# Patient Record
Sex: MALE | Race: WHITE | NOT HISPANIC OR LATINO
[De-identification: names, ages, dates, MRNs, and addresses within clinical notes are randomized per-mention and may not be internally consistent; named-entity substitution may affect disease eponyms.]

---

## 2021-10-29 ENCOUNTER — APPOINTMENT (OUTPATIENT)
Dept: OTOLARYNGOLOGY | Facility: CLINIC | Age: 86
End: 2021-10-29
Payer: MEDICARE

## 2021-10-29 VITALS — WEIGHT: 171 LBS | HEIGHT: 65 IN | BODY MASS INDEX: 28.49 KG/M2

## 2021-10-29 DIAGNOSIS — Z78.9 OTHER SPECIFIED HEALTH STATUS: ICD-10-CM

## 2021-10-29 DIAGNOSIS — Z83.3 FAMILY HISTORY OF DIABETES MELLITUS: ICD-10-CM

## 2021-10-29 DIAGNOSIS — C44.212 BASAL CELL CARCINOMA OF SKIN OF RIGHT EAR AND EXTERNAL AURICULAR CANAL: ICD-10-CM

## 2021-10-29 DIAGNOSIS — Z86.79 PERSONAL HISTORY OF OTHER DISEASES OF THE CIRCULATORY SYSTEM: ICD-10-CM

## 2021-10-29 DIAGNOSIS — Z87.891 PERSONAL HISTORY OF NICOTINE DEPENDENCE: ICD-10-CM

## 2021-10-29 PROBLEM — Z00.00 ENCOUNTER FOR PREVENTIVE HEALTH EXAMINATION: Status: ACTIVE | Noted: 2021-10-29

## 2021-10-29 PROCEDURE — 99203 OFFICE O/P NEW LOW 30 MIN: CPT

## 2021-10-29 RX ORDER — SIMVASTATIN 80 MG/1
TABLET, FILM COATED ORAL
Refills: 0 | Status: ACTIVE | COMMUNITY

## 2021-10-29 RX ORDER — ALLOPURINOL 100 MG/1
100 TABLET ORAL
Refills: 0 | Status: ACTIVE | COMMUNITY

## 2021-10-29 RX ORDER — RAMIPRIL 5 MG/1
CAPSULE ORAL
Refills: 0 | Status: ACTIVE | COMMUNITY

## 2021-10-29 RX ORDER — COLCHICINE 0.6 MG/1
TABLET ORAL
Refills: 0 | Status: ACTIVE | COMMUNITY

## 2021-10-29 RX ORDER — DIGOXIN 0.06 MG/1
TABLET ORAL
Refills: 0 | Status: ACTIVE | COMMUNITY

## 2021-10-29 RX ORDER — WARFARIN 4 MG/1
TABLET ORAL
Refills: 0 | Status: ACTIVE | COMMUNITY

## 2021-10-29 RX ORDER — METOPROLOL TARTRATE 75 MG/1
TABLET, FILM COATED ORAL
Refills: 0 | Status: ACTIVE | COMMUNITY

## 2021-10-29 RX ORDER — METFORMIN HYDROCHLORIDE 625 MG/1
TABLET ORAL
Refills: 0 | Status: ACTIVE | COMMUNITY

## 2021-10-29 NOTE — REASON FOR VISIT
[Initial Consultation] : an initial consultation for [Family Member] : family member [FreeTextEntry2] : referred by Dr. Yao Ornelas, ENT for growth behind right ear.

## 2021-10-29 NOTE — HISTORY OF PRESENT ILLNESS
[de-identified] : 85 year old male referred by Dr. Yao Ornelas, ENT for growth behind right ear. Son reports growth inside right ear first noticed in 2019 and was removed. States currently has new growth behind right ear and was told could be due to the growth removal from 2019. Reports has been increasing in size and intermittent bleeding. Denies pain. Former smoker, quit over 25 years ago. Denies fevers, chills, night sweats, weight loss. Biopsy 10/14/2021 showed- tiny cluster tissue composed of atypical cells in a desmoplastic stroma, suggestive of basal cell carcinoma. 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  Patient notes he is on Coumadin for atrial fibrillation and has had some bleeding from a crust on top of the lesion when he has disturbed it.

## 2021-10-29 NOTE — PHYSICAL EXAM
[FreeTextEntry1] : Using a cane otherwise doing quite well [de-identified] : No cervical adenopathy noted some chronic skin changes from sun exposure [de-identified] : Normal salivary gland exam including the right parotid with no palpable lymphadenopathy [de-identified] : Normal TMJ mobility [de-identified] : Left side within normal limits right side obvious basal cell carcinoma involving the inferior portion of the conchal bowl and superior portion of the lobule, 1.5 cm in diameter [de-identified] : Tympanic membrane is within normal limits no evidence of external auditory canal involvement [Normal] : mucosa is normal

## 2021-11-02 PROBLEM — C44.212 BASAL CELL CARCINOMA, EAR, RIGHT: Status: ACTIVE | Noted: 2021-11-02

## 2021-11-23 ENCOUNTER — OUTPATIENT (OUTPATIENT)
Dept: OUTPATIENT SERVICES | Facility: HOSPITAL | Age: 86
LOS: 1 days | End: 2021-11-23
Payer: MEDICARE

## 2021-11-23 VITALS
WEIGHT: 175.05 LBS | TEMPERATURE: 97 F | OXYGEN SATURATION: 99 % | HEIGHT: 62 IN | SYSTOLIC BLOOD PRESSURE: 110 MMHG | DIASTOLIC BLOOD PRESSURE: 70 MMHG | RESPIRATION RATE: 16 BRPM | HEART RATE: 88 BPM

## 2021-11-23 DIAGNOSIS — C44.90 UNSPECIFIED MALIGNANT NEOPLASM OF SKIN, UNSPECIFIED: Chronic | ICD-10-CM

## 2021-11-23 DIAGNOSIS — C44.90 UNSPECIFIED MALIGNANT NEOPLASM OF SKIN, UNSPECIFIED: ICD-10-CM

## 2021-11-23 DIAGNOSIS — C44.212 BASAL CELL CARCINOMA OF SKIN OF RIGHT EAR AND EXTERNAL AURICULAR CANAL: ICD-10-CM

## 2021-11-23 DIAGNOSIS — R54 AGE-RELATED PHYSICAL DEBILITY: ICD-10-CM

## 2021-11-23 DIAGNOSIS — M12.9 ARTHROPATHY, UNSPECIFIED: Chronic | ICD-10-CM

## 2021-11-23 LAB
A1C WITH ESTIMATED AVERAGE GLUCOSE RESULT: 6.8 % — HIGH (ref 4–5.6)
ALBUMIN SERPL ELPH-MCNC: 4.2 G/DL — SIGNIFICANT CHANGE UP (ref 3.3–5)
ALP SERPL-CCNC: 66 U/L — SIGNIFICANT CHANGE UP (ref 40–120)
ALT FLD-CCNC: 17 U/L — SIGNIFICANT CHANGE UP (ref 4–41)
ANION GAP SERPL CALC-SCNC: 11 MMOL/L — SIGNIFICANT CHANGE UP (ref 7–14)
AST SERPL-CCNC: 16 U/L — SIGNIFICANT CHANGE UP (ref 4–40)
BILIRUB SERPL-MCNC: 0.6 MG/DL — SIGNIFICANT CHANGE UP (ref 0.2–1.2)
BUN SERPL-MCNC: 14 MG/DL — SIGNIFICANT CHANGE UP (ref 7–23)
CALCIUM SERPL-MCNC: 9.3 MG/DL — SIGNIFICANT CHANGE UP (ref 8.4–10.5)
CHLORIDE SERPL-SCNC: 109 MMOL/L — HIGH (ref 98–107)
CO2 SERPL-SCNC: 22 MMOL/L — SIGNIFICANT CHANGE UP (ref 22–31)
CREAT SERPL-MCNC: 0.77 MG/DL — SIGNIFICANT CHANGE UP (ref 0.5–1.3)
ESTIMATED AVERAGE GLUCOSE: 148 — SIGNIFICANT CHANGE UP
GLUCOSE SERPL-MCNC: 133 MG/DL — HIGH (ref 70–99)
HCT VFR BLD CALC: 38 % — LOW (ref 39–50)
HGB BLD-MCNC: 12.1 G/DL — LOW (ref 13–17)
MCHC RBC-ENTMCNC: 29.6 PG — SIGNIFICANT CHANGE UP (ref 27–34)
MCHC RBC-ENTMCNC: 31.8 GM/DL — LOW (ref 32–36)
MCV RBC AUTO: 92.9 FL — SIGNIFICANT CHANGE UP (ref 80–100)
NRBC # BLD: 0 /100 WBCS — SIGNIFICANT CHANGE UP
NRBC # FLD: 0 K/UL — SIGNIFICANT CHANGE UP
PLATELET # BLD AUTO: 203 K/UL — SIGNIFICANT CHANGE UP (ref 150–400)
POTASSIUM SERPL-MCNC: 4.4 MMOL/L — SIGNIFICANT CHANGE UP (ref 3.5–5.3)
POTASSIUM SERPL-SCNC: 4.4 MMOL/L — SIGNIFICANT CHANGE UP (ref 3.5–5.3)
PROT SERPL-MCNC: 6.7 G/DL — SIGNIFICANT CHANGE UP (ref 6–8.3)
RBC # BLD: 4.09 M/UL — LOW (ref 4.2–5.8)
RBC # FLD: 14 % — SIGNIFICANT CHANGE UP (ref 10.3–14.5)
SODIUM SERPL-SCNC: 142 MMOL/L — SIGNIFICANT CHANGE UP (ref 135–145)
WBC # BLD: 3.75 K/UL — LOW (ref 3.8–10.5)
WBC # FLD AUTO: 3.75 K/UL — LOW (ref 3.8–10.5)

## 2021-11-23 PROCEDURE — 93010 ELECTROCARDIOGRAM REPORT: CPT

## 2021-11-23 RX ORDER — SIMVASTATIN 20 MG/1
1 TABLET, FILM COATED ORAL
Qty: 0 | Refills: 0 | DISCHARGE

## 2021-11-23 RX ORDER — RAMIPRIL 5 MG
1 CAPSULE ORAL
Qty: 0 | Refills: 0 | DISCHARGE

## 2021-11-23 RX ORDER — SODIUM CHLORIDE 9 MG/ML
1000 INJECTION, SOLUTION INTRAVENOUS
Refills: 0 | Status: DISCONTINUED | OUTPATIENT
Start: 2021-12-01 | End: 2021-12-15

## 2021-11-23 RX ORDER — METOPROLOL TARTRATE 50 MG
1 TABLET ORAL
Qty: 0 | Refills: 0 | DISCHARGE

## 2021-11-23 NOTE — H&P PST ADULT - NSICDXPASTMEDICALHX_GEN_ALL_CORE_FT
PAST MEDICAL HISTORY:  Altered gait ambulates with cane    Atrial fibrillation     Diabetes mellitus type II diagnosed 5-10 years ago    Gout     Hypercholesterolemia     Hypertension     Language barrier native language Tuvaluan; comprends and verbalizes some English    Pain fractured pelvis     PAST MEDICAL HISTORY:  Altered gait ambulates with cane    Atrial fibrillation     Diabetes mellitus type II diagnosed 5-10 years ago    Gout     Hard of hearing     Hypercholesterolemia     Hypertension     Language barrier native language [place]an; comprends and verbalizes some English    Pain fractured pelvis

## 2021-11-23 NOTE — H&P PST ADULT - PROBLEM SELECTOR PLAN 2
Await medical evaluation with pcp due to advanced age and patient poor historian  * Instructed to stop metformin 24 hours before surgery  * Instructed to speak with pcp regarding when or if to stop eliquis  * Instructed to take normal am dose of    the am of surgery Await medical evaluation with pcp due to advanced age and patient poor historian  * Instructed to stop metformin 24 hours before surgery  * Instructed to speak with pcp regarding when or if to stop eliquis  * Instructed to take normal am dose of allopurinol the am of surgery Await medical evaluation with pcp due to advanced age and patient poor historian  * Instructed to stop metformin 24 hours before surgery  * Instructed to speak with pcp regarding when or if to stop eliquis  * Instructed to take normal am dose of allopurinol the am of surgery  * Instructed to stop colchicine seven days before surgery

## 2021-11-23 NOTE — H&P PST ADULT - NSANTHOSAYNRD_GEN_A_CORE
No. KLAUDIA screening performed.  STOP BANG Legend: 0-2 = LOW Risk; 3-4 = INTERMEDIATE Risk; 5-8 = HIGH Risk

## 2021-11-23 NOTE — H&P PST ADULT - NSICDXPASTSURGICALHX_GEN_ALL_CORE_FT
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

## 2021-11-23 NOTE — H&P PST ADULT - ATTENDING COMMENTS
Consent via Hebrew .  Patient presents for wide local excision of RIGHT ear BCC, local reconstruction as indicated with Dr. Dixon.  Son is also here who is power of  for the patient.

## 2021-11-23 NOTE — H&P PST ADULT - PROBLEM SELECTOR PLAN 1
This is an 86 y/o male who is cheduled for wide local excision right ear, ear cartilage graft to nose or ear, full thickness graft 20 square or less, adjacent tissue transfer 10 square or less on Dec. 1, 2021 This is an 86 y/o male who is cheduled for wide local excision right ear, ear cartilage graft to nose or ear, full thickness graft 20 square or less, adjacent tissue transfer 10 square or less on Dec. 1, 2021  * Instructed to speak with surgeon regarding covid testing  * Given preop instructions

## 2021-11-23 NOTE — H&P PST ADULT - HISTORY OF PRESENT ILLNESS
This is an 84 y/o male with h/o excision of mass (basal cell carcinoma) behind the right ear in 2019. Subsequently it has recurred confirmed with biopsy. Scheduled for wide local excision right ear, ear cartilage graft to nose or ear, full thickness graft 20 square or less, adjacent tissue transfer 10 square or less This is an 84 y/o male who is a poor historian. Patient's native language is [place]. Refused hospital  and utilized son, Jai, for accurate information. Patient presents with h/o excision of mass (basal cell carcinoma) behind the right ear in 2019. Subsequently it has recurred confirmed with biopsy. Scheduled for wide local excision right ear, ear cartilage graft to nose or ear, full thickness graft 20 square or less, adjacent tissue transfer 10 square or less

## 2021-11-30 ENCOUNTER — TRANSCRIPTION ENCOUNTER (OUTPATIENT)
Age: 86
End: 2021-11-30

## 2021-11-30 RX ORDER — APIXABAN 2.5 MG/1
1 TABLET, FILM COATED ORAL
Qty: 0 | Refills: 0 | DISCHARGE

## 2021-11-30 RX ORDER — ALLOPURINOL 300 MG
1 TABLET ORAL
Qty: 0 | Refills: 0 | DISCHARGE

## 2021-11-30 RX ORDER — METFORMIN HYDROCHLORIDE 850 MG/1
1 TABLET ORAL
Qty: 0 | Refills: 0 | DISCHARGE

## 2021-11-30 RX ORDER — COLCHICINE 0.6 MG
1 TABLET ORAL
Qty: 0 | Refills: 0 | DISCHARGE

## 2021-11-30 RX ORDER — DIGOXIN 250 MCG
1 TABLET ORAL
Qty: 0 | Refills: 0 | DISCHARGE

## 2021-11-30 NOTE — ASU PATIENT PROFILE, ADULT - FALL HARM RISK - HARM RISK INTERVENTIONS

## 2021-11-30 NOTE — ASU PATIENT PROFILE, ADULT - HEALTHCARE INFORMATION NEEDED, PROFILE
Patient Instructions by Christina Mendez CMA at 06/27/18 10:40 AM     Author:  Christina Mendez CMA Service:  (none) Author Type:  Certified Medical Assistant     Filed:  06/27/18 10:41 AM Encounter Date:  6/27/2018 Status:  Signed     :  Christina Mendez CMA (Certified Medical Assistant)            Next visit with ILA ALVARADO is on 12/24/2018 at  9:45 AM in INTERNAL MEDICINE SEQ       If you have any questions, please call our office at [phone]    Additional Educational Resources:  For additional resources regarding your symptoms, diagnosis, or further health information, please visit the Health Resources section on [email or web address] or the Online Health Resources section in [employer].          Revision History        User Key Date/Time User Provider Type Action    > [N/A] 06/27/18 10:41 AM Christina Mendez CMA Certified Medical Assistant Sign             none

## 2021-11-30 NOTE — ASU PATIENT PROFILE, ADULT - NSICDXPASTMEDICALHX_GEN_ALL_CORE_FT
PAST MEDICAL HISTORY:  Altered gait ambulates with cane    Atrial fibrillation     Diabetes mellitus type II diagnosed 5-10 years ago    Gout     Hard of hearing     Hypercholesterolemia     Hypertension     Language barrier native language Azerbaijani; comprends and verbalizes some English    Pain fractured pelvis

## 2021-12-01 ENCOUNTER — APPOINTMENT (OUTPATIENT)
Dept: OTOLARYNGOLOGY | Facility: HOSPITAL | Age: 86
End: 2021-12-01

## 2021-12-01 ENCOUNTER — OUTPATIENT (OUTPATIENT)
Dept: OUTPATIENT SERVICES | Facility: HOSPITAL | Age: 86
LOS: 1 days | Discharge: HOME | End: 2021-12-01
Payer: MEDICARE

## 2021-12-01 ENCOUNTER — RESULT REVIEW (OUTPATIENT)
Age: 86
End: 2021-12-01

## 2021-12-01 VITALS
TEMPERATURE: 98 F | DIASTOLIC BLOOD PRESSURE: 77 MMHG | HEIGHT: 62 IN | SYSTOLIC BLOOD PRESSURE: 121 MMHG | HEART RATE: 86 BPM | OXYGEN SATURATION: 95 % | RESPIRATION RATE: 15 BRPM | WEIGHT: 175.05 LBS

## 2021-12-01 VITALS
OXYGEN SATURATION: 95 % | RESPIRATION RATE: 17 BRPM | DIASTOLIC BLOOD PRESSURE: 83 MMHG | HEART RATE: 88 BPM | SYSTOLIC BLOOD PRESSURE: 130 MMHG | TEMPERATURE: 98 F

## 2021-12-01 DIAGNOSIS — M12.9 ARTHROPATHY, UNSPECIFIED: Chronic | ICD-10-CM

## 2021-12-01 DIAGNOSIS — C44.212 BASAL CELL CARCINOMA OF SKIN OF RIGHT EAR AND EXTERNAL AURICULAR CANAL: ICD-10-CM

## 2021-12-01 DIAGNOSIS — C44.90 UNSPECIFIED MALIGNANT NEOPLASM OF SKIN, UNSPECIFIED: Chronic | ICD-10-CM

## 2021-12-01 LAB
GLUCOSE BLDC GLUCOMTR-MCNC: 137 MG/DL — HIGH (ref 70–99)
GLUCOSE BLDC GLUCOMTR-MCNC: 221 MG/DL — HIGH (ref 70–99)

## 2021-12-01 PROCEDURE — 14301 TIS TRNFR ANY 30.1-60 SQ CM: CPT

## 2021-12-01 PROCEDURE — 88305 TISSUE EXAM BY PATHOLOGIST: CPT | Mod: 26

## 2021-12-01 PROCEDURE — 14302 TIS TRNFR ADDL 30 SQ CM: CPT

## 2021-12-01 PROCEDURE — 14060 TIS TRNFR E/N/E/L 10 SQ CM/<: CPT | Mod: 59

## 2021-12-01 PROCEDURE — 21235 EAR CARTILAGE GRAFT: CPT

## 2021-12-01 PROCEDURE — 11644 EXC F/E/E/N/L MAL+MRG 3.1-4: CPT | Mod: 59

## 2021-12-01 PROCEDURE — 88331 PATH CONSLTJ SURG 1 BLK 1SPC: CPT | Mod: 26

## 2021-12-01 RX ORDER — FENTANYL CITRATE 50 UG/ML
25 INJECTION INTRAVENOUS
Refills: 0 | Status: DISCONTINUED | OUTPATIENT
Start: 2021-12-01 | End: 2021-12-01

## 2021-12-01 RX ORDER — OXYCODONE HYDROCHLORIDE 5 MG/1
1 TABLET ORAL
Qty: 10 | Refills: 0
Start: 2021-12-01 | End: 2021-12-14

## 2021-12-01 RX ORDER — ACETAMINOPHEN 500 MG
650 TABLET ORAL EVERY 6 HOURS
Refills: 0 | Status: DISCONTINUED | OUTPATIENT
Start: 2021-12-01 | End: 2021-12-15

## 2021-12-01 RX ORDER — ACETAMINOPHEN 500 MG
2 TABLET ORAL
Qty: 0 | Refills: 0 | DISCHARGE
Start: 2021-12-01

## 2021-12-01 RX ORDER — ONDANSETRON 8 MG/1
4 TABLET, FILM COATED ORAL
Refills: 0 | Status: DISCONTINUED | OUTPATIENT
Start: 2021-12-01 | End: 2021-12-15

## 2021-12-01 RX ORDER — OXYCODONE HYDROCHLORIDE 5 MG/1
5 TABLET ORAL EVERY 6 HOURS
Refills: 0 | Status: DISCONTINUED | OUTPATIENT
Start: 2021-12-01 | End: 2021-12-01

## 2021-12-01 RX ORDER — ACETYLCYSTEINE 200 MG/ML
4 VIAL (ML) MISCELLANEOUS THREE TIMES A DAY
Refills: 0 | Status: DISCONTINUED | OUTPATIENT
Start: 2021-12-01 | End: 2021-12-15

## 2021-12-01 RX ORDER — CEPHALEXIN 500 MG
1 CAPSULE ORAL
Qty: 14 | Refills: 0
Start: 2021-12-01 | End: 2021-12-07

## 2021-12-01 NOTE — ASU DISCHARGE PLAN (ADULT/PEDIATRIC) - ASU DC SPECIAL INSTRUCTIONSFT
Surgical Site Care  You may remove the outer head wrap bandage tomorrow afternoon 12/2  The right ear has white strip bandages. Leave until Friday 12/2, okay to remove friday. You have stitches in the right ear leave these in place  The left ear has a yellow bandage. Leave this in place. Will be removed in clinic.   After the head wrap apply VASELINE to the left yellow bandage. After right ear bandage off, apply VASELINE to right ear stitches. Apply VASELINE twice a day  After Surgery Instructions  Hygiene  Please do not shower or bathe for 48 hours. After please be gentle with the site of surgery. No scrubbing or rubbing. May let water and soap over ears  Diet  Return to your usual diet  Activity  No heavy lifting or strenuous activity for 2 weeks. You should resume casual activity.  Medications  Please resume your normal medications   May resume your blood thinner, eliquis in 2 days   Pain medications  For mild or moderate pain, please take over the counter tylenol as needed for pain  For severe pain, may take oxycodone  Antibiotics  Please take as directed. For 7 days  Follow up  Please follow up with Dr. Castanon and Dr Dixon . Please call the otolaryngology office at  to confirm your appointment Surgical Site Care  You may remove the outer head wrap bandage tomorrow afternoon 12/2  The right ear has white strip bandages. Leave until Friday 12/2, okay to remove friday. You have stitches in the right ear leave these in place  The left ear has a yellow bandage. Leave this in place. Will be removed in clinic.   After the head wrap apply VASELINE to the left yellow bandage. After right ear bandage off, apply VASELINE to right ear stitches. Apply VASELINE twice a day  After Surgery Instructions  Hygiene  Please do not shower or bathe for 48 hours. After please be gentle with the site of surgery. No scrubbing or rubbing. May let water and soap over ears  Diet  Return to your usual diet  Activity  No heavy lifting or strenuous activity for 2 weeks. You should resume casual activity.  Medications  Please resume your normal medications   May resume your blood thinner, eliquis in 2 days   Pain medications  For mild or moderate pain, please take over the counter tylenol as needed for pain  For severe pain, may take oxycodone  Antibiotics  Please take as directed. For 7 days  Follow up  Please follow up with Dr. Castanon and Dr Dixon . Please call the otolaryngology office at  to confirm your appointment    Please refer to MD pre-printed instruction sheet in folder.

## 2021-12-01 NOTE — ASU DISCHARGE PLAN (ADULT/PEDIATRIC) - PROCEDURE
Right ear mass excision, left ear cartilage graft, local tissue rearrangement, complex closure right ear

## 2021-12-01 NOTE — ASU DISCHARGE PLAN (ADULT/PEDIATRIC) - NURSING INSTRUCTIONS
DO NOT take any Tylenol (Acetaminophen) or narcotics containing Tylenol until after 5:30 PM. You received Tylenol during your operation and it can cause damage to your liver if too much is taken within a 24 hour time period.

## 2021-12-01 NOTE — ASU DISCHARGE PLAN (ADULT/PEDIATRIC) - CALL YOUR DOCTOR IF YOU HAVE ANY OF THE FOLLOWING:
Bleeding that does not stop/Wound/Surgical Site with redness, or foul smelling discharge or pus/Nausea and vomiting that does not stop/Inability to tolerate liquids or foods/Increased irritability or sluggishness Bleeding that does not stop/Pain not relieved by Medications/Fever greater than (need to indicate Fahrenheit or Celsius)/Wound/Surgical Site with redness, or foul smelling discharge or pus/Nausea and vomiting that does not stop/Unable to urinate/Inability to tolerate liquids or foods/Increased irritability or sluggishness

## 2021-12-01 NOTE — ASU DISCHARGE PLAN (ADULT/PEDIATRIC) - NS MD DC FALL RISK RISK
For information on Fall & Injury Prevention, visit: [email or web address] OR  [email or web address] OR  https://www.cdc.gov/steadi/patient.html

## 2021-12-02 PROBLEM — E78.00 PURE HYPERCHOLESTEROLEMIA, UNSPECIFIED: Chronic | Status: ACTIVE | Noted: 2021-11-23

## 2021-12-02 PROBLEM — R52 PAIN, UNSPECIFIED: Chronic | Status: ACTIVE | Noted: 2021-11-23

## 2021-12-02 PROBLEM — I10 ESSENTIAL (PRIMARY) HYPERTENSION: Chronic | Status: ACTIVE | Noted: 2021-11-23

## 2021-12-02 PROBLEM — M10.9 GOUT, UNSPECIFIED: Chronic | Status: ACTIVE | Noted: 2021-11-23

## 2021-12-02 PROBLEM — H91.90 UNSPECIFIED HEARING LOSS, UNSPECIFIED EAR: Chronic | Status: ACTIVE | Noted: 2021-11-23

## 2021-12-02 PROBLEM — E11.9 TYPE 2 DIABETES MELLITUS WITHOUT COMPLICATIONS: Chronic | Status: ACTIVE | Noted: 2021-11-23

## 2021-12-02 PROBLEM — Z78.9 OTHER SPECIFIED HEALTH STATUS: Chronic | Status: ACTIVE | Noted: 2021-11-23

## 2021-12-02 PROBLEM — I48.91 UNSPECIFIED ATRIAL FIBRILLATION: Chronic | Status: ACTIVE | Noted: 2021-11-23

## 2021-12-02 PROBLEM — R26.9 UNSPECIFIED ABNORMALITIES OF GAIT AND MOBILITY: Chronic | Status: ACTIVE | Noted: 2021-11-23

## 2021-12-08 LAB — SURGICAL PATHOLOGY STUDY: SIGNIFICANT CHANGE UP

## 2021-12-09 ENCOUNTER — APPOINTMENT (OUTPATIENT)
Dept: OTOLARYNGOLOGY | Facility: CLINIC | Age: 86
End: 2021-12-09
Payer: MEDICARE

## 2021-12-09 VITALS
SYSTOLIC BLOOD PRESSURE: 146 MMHG | BODY MASS INDEX: 28.49 KG/M2 | HEART RATE: 58 BPM | DIASTOLIC BLOOD PRESSURE: 71 MMHG | WEIGHT: 171 LBS | HEIGHT: 65 IN

## 2021-12-09 DIAGNOSIS — C44.201 UNSPECIFIED MALIGNANT NEOPLASM OF SKIN OF UNSPECIFIED EAR AND EXTERNAL AURICULAR CANAL: ICD-10-CM

## 2021-12-09 PROCEDURE — 99024 POSTOP FOLLOW-UP VISIT: CPT

## 2021-12-09 RX ORDER — TRAMADOL HYDROCHLORIDE AND ACETAMINOPHEN 37.5; 325 MG/1; MG/1
37.5-325 TABLET, FILM COATED ORAL
Refills: 0 | Status: COMPLETED | COMMUNITY
End: 2021-12-09

## 2021-12-09 NOTE — HISTORY OF PRESENT ILLNESS
[de-identified] : 85 year old male postop from left CFA flap and ear lobe reconstruction. Here for

## 2021-12-09 NOTE — PHYSICAL EXAM
[de-identified] : Left ear bolster removed. Right ear sutures removed. Healing well. Ointment applied.  [Midline] : trachea located in midline position [Normal] : no rashes

## 2021-12-09 NOTE — PHYSICAL EXAM
[de-identified] : Left ear bolster removed. Right ear sutures removed. Healing well. Ointment applied.  [Midline] : trachea located in midline position [Normal] : no rashes

## 2021-12-09 NOTE — REASON FOR VISIT
[Post-Operative Visit] : a post-operative visit [FreeTextEntry2] : 85 year old male with ear lobe BCC s/p resection and recon last week. Here for follow up

## 2021-12-09 NOTE — ASSESSMENT
[FreeTextEntry1] : 85 year old male with right ear lobe BCC s/p resection and reconstruction here for POV1. He is doing well. Sutures were removed as well as left ear bolster. I recommended a second stage procedure to release the ear lobe in 4 weeks. They are not sure at this time if they would like to proceed with the procedure, or are okay with the appearance of the ear lobe as is. They will return to see me in 1 month.

## 2021-12-23 ENCOUNTER — APPOINTMENT (OUTPATIENT)
Dept: OTOLARYNGOLOGY | Facility: CLINIC | Age: 86
End: 2021-12-23
Payer: MEDICARE

## 2021-12-23 VITALS
DIASTOLIC BLOOD PRESSURE: 83 MMHG | HEIGHT: 65 IN | BODY MASS INDEX: 28.49 KG/M2 | HEART RATE: 80 BPM | WEIGHT: 171 LBS | SYSTOLIC BLOOD PRESSURE: 136 MMHG

## 2021-12-23 PROCEDURE — 99024 POSTOP FOLLOW-UP VISIT: CPT

## 2021-12-23 NOTE — HISTORY OF PRESENT ILLNESS
[de-identified] : 86 year old male s/p Wide local excision of inferior auricle, 4 x 4 cm 12/1/21.  States doing better, both ears feel slightly itchy at the  incision site.

## 2021-12-23 NOTE — REASON FOR VISIT
[Subsequent Evaluation] : a subsequent evaluation for [FreeTextEntry2] : s/p Wide local excision of inferior auricle, 4 x 4 cm 12/1/21

## 2022-01-13 ENCOUNTER — APPOINTMENT (OUTPATIENT)
Dept: OTOLARYNGOLOGY | Facility: CLINIC | Age: 87
End: 2022-01-13

## 2022-03-09 NOTE — ASU PATIENT PROFILE, ADULT - ANESTHESIA, PREVIOUS REACTION, PROFILE
GYN attg:  Pt seen and examined. I agree with above assessment and plan. Cont IV ABX until am and switch over to po abx and discharge home on po abx total 2 weeks with f/u with private GYN. Pt should have repeat imaging in 6-8 weeks. Pt verbalized full understanding.  DELMI Goodwin MD none

## 2023-04-05 NOTE — H&P PST ADULT - NEGATIVE PSYCHIATRIC SYMPTOMS
Dosing wt 147 pounds used for calorie and protein needs calculations. Defer fluids for team  no suicidal ideation/no depression/no anxiety

## 2025-04-07 NOTE — H&P PST ADULT - FUNCTIONAL ASSESSMENT - BASIC MOBILITY PT AGE POP HIDDEN
FIRST REQUEST RECEIVED VIA FAX FROM [facility] REQUESTING LAST PHYSICAL AND HISTORY EXAMS, LAST EMERGENCY ROOM RECORDS, LAST OPERATIVE REPORTS/CONSULTATIONS, LAST IMAGING REPORTS/FILMS, AND LAST PHYSICIAN PROGRESS NOTES BE SENT TO Counts include 234 beds at the [facility]. [facility]     Adult